# Patient Record
Sex: FEMALE | Race: BLACK OR AFRICAN AMERICAN | Employment: FULL TIME | URBAN - METROPOLITAN AREA
[De-identification: names, ages, dates, MRNs, and addresses within clinical notes are randomized per-mention and may not be internally consistent; named-entity substitution may affect disease eponyms.]

---

## 2018-08-30 ENCOUNTER — TELEPHONE (OUTPATIENT)
Dept: OBGYN CLINIC | Facility: CLINIC | Age: 28
End: 2018-08-30

## 2018-08-30 ENCOUNTER — OFFICE VISIT (OUTPATIENT)
Dept: OBGYN CLINIC | Facility: CLINIC | Age: 28
End: 2018-08-30
Payer: COMMERCIAL

## 2018-08-30 VITALS
DIASTOLIC BLOOD PRESSURE: 79 MMHG | BODY MASS INDEX: 33.16 KG/M2 | SYSTOLIC BLOOD PRESSURE: 118 MMHG | HEART RATE: 86 BPM | HEIGHT: 63 IN | WEIGHT: 187.13 LBS

## 2018-08-30 DIAGNOSIS — N92.6 MISSED MENSES: Primary | ICD-10-CM

## 2018-08-30 LAB
CONTROL LINE PRESENT WITH A CLEAR BACKGROUND (YES/NO): YES YES/NO
KIT LOT #: NORMAL NUMERIC
PREGNANCY TEST, URINE: POSITIVE

## 2018-08-30 PROCEDURE — 81025 URINE PREGNANCY TEST: CPT | Performed by: ADVANCED PRACTICE MIDWIFE

## 2018-08-30 PROCEDURE — 99202 OFFICE O/P NEW SF 15 MIN: CPT | Performed by: ADVANCED PRACTICE MIDWIFE

## 2018-08-30 RX ORDER — PRENATAL VIT,CAL 76/IRON/FOLIC 29 MG-1 MG
1 TABLET ORAL DAILY
Qty: 90 TABLET | Refills: 3 | Status: SHIPPED | OUTPATIENT
Start: 2018-08-30 | End: 2018-11-28

## 2018-08-30 NOTE — TELEPHONE ENCOUNTER
Pt saw BR today and would like an order put in for an [address]. please advise

## 2018-08-30 NOTE — PROGRESS NOTES
Venkata Thao is a 29year old , current EGA of Unknown presents for amenorrhea. Reports LMP as none, breastfeeding 16 month old. This is an unplanned pregnancy and patient is excited. +upt couple days ago.     Pt desires natural childbirth

## 2018-08-30 NOTE — TELEPHONE ENCOUNTER
Noted order placed per BR at 1501. Advised pt that order was placed. Pt has phone # to schedule.  Pt verbalized an understanding & agrees w/ plan

## 2018-08-31 ENCOUNTER — HOSPITAL ENCOUNTER (OUTPATIENT)
Dept: ULTRASOUND IMAGING | Age: 28
Discharge: HOME | End: 2018-08-31
Attending: ADVANCED PRACTICE MIDWIFE
Payer: COMMERCIAL

## 2018-08-31 DIAGNOSIS — N92.6 MISSED MENSES: ICD-10-CM

## 2018-08-31 PROCEDURE — 76801 OB US < 14 WKS SINGLE FETUS: CPT | Performed by: ADVANCED PRACTICE MIDWIFE

## 2018-08-31 PROCEDURE — 76817 TRANSVAGINAL US OBSTETRIC: CPT | Performed by: ADVANCED PRACTICE MIDWIFE

## 2018-09-01 ENCOUNTER — TELEPHONE (OUTPATIENT)
Dept: OBGYN CLINIC | Facility: CLINIC | Age: 28
End: 2018-09-01

## 2018-09-01 PROBLEM — N92.6 MISSED MENSES: Status: ACTIVE | Noted: 2018-09-01

## 2018-09-01 PROBLEM — Z78.9 BREASTFEEDING (INFANT): Status: ACTIVE | Noted: 2018-09-01

## 2018-09-22 ENCOUNTER — NURSE ONLY (OUTPATIENT)
Dept: OBGYN CLINIC | Facility: CLINIC | Age: 28
End: 2018-09-22
Payer: COMMERCIAL

## 2018-09-22 ENCOUNTER — LAB ENCOUNTER (OUTPATIENT)
Dept: LAB | Facility: HOSPITAL | Age: 28
End: 2018-09-22
Attending: ADVANCED PRACTICE MIDWIFE
Payer: COMMERCIAL

## 2018-09-22 VITALS — BODY MASS INDEX: 32.92 KG/M2 | WEIGHT: 188.13 LBS | HEIGHT: 63.5 IN

## 2018-09-22 DIAGNOSIS — Z3A.10 10 WEEKS GESTATION OF PREGNANCY: ICD-10-CM

## 2018-09-22 DIAGNOSIS — Z3A.10 10 WEEKS GESTATION OF PREGNANCY: Primary | ICD-10-CM

## 2018-09-22 LAB
ANTIBODY SCREEN: NEGATIVE
BASOPHILS # BLD: 0 K/UL (ref 0–0.2)
BASOPHILS NFR BLD: 0 %
EOSINOPHIL # BLD: 0.1 K/UL (ref 0–0.7)
EOSINOPHIL NFR BLD: 1 %
ERYTHROCYTE [DISTWIDTH] IN BLOOD BY AUTOMATED COUNT: 13.2 % (ref 11–15)
FOLATE SERPL-MCNC: >23.9 NG/ML
HBA1C MFR BLD: 5.3 % (ref 4–6)
HCT VFR BLD AUTO: 37 % (ref 35–48)
HGB BLD-MCNC: 12.6 G/DL (ref 12–16)
HGB S BLD QL SOLY: NEGATIVE
LYMPHOCYTES # BLD: 2.3 K/UL (ref 1–4)
LYMPHOCYTES NFR BLD: 39 %
MCH RBC QN AUTO: 31.8 PG (ref 27–32)
MCHC RBC AUTO-ENTMCNC: 34 G/DL (ref 32–37)
MCV RBC AUTO: 93.3 FL (ref 80–100)
MONOCYTES # BLD: 0.4 K/UL (ref 0–1)
MONOCYTES NFR BLD: 7 %
NEUTROPHILS # BLD AUTO: 3.2 K/UL (ref 1.8–7.7)
NEUTROPHILS NFR BLD: 53 %
PLATELET # BLD AUTO: 350 K/UL (ref 140–400)
PMV BLD AUTO: 7.2 FL (ref 7.4–10.3)
RBC # BLD AUTO: 3.96 M/UL (ref 3.7–5.4)
RH BLOOD TYPE: POSITIVE
RUBV IGG SER-ACNC: 11.6 IU/ML
VIT B12 SERPL-MCNC: 399 PG/ML (ref 181–914)
WBC # BLD AUTO: 6 K/UL (ref 4–11)

## 2018-09-22 PROCEDURE — 82607 VITAMIN B-12: CPT

## 2018-09-22 PROCEDURE — 82746 ASSAY OF FOLIC ACID SERUM: CPT

## 2018-09-22 PROCEDURE — 86900 BLOOD TYPING SEROLOGIC ABO: CPT | Performed by: ADVANCED PRACTICE MIDWIFE

## 2018-09-22 PROCEDURE — 83036 HEMOGLOBIN GLYCOSYLATED A1C: CPT

## 2018-09-22 PROCEDURE — 86780 TREPONEMA PALLIDUM: CPT

## 2018-09-22 PROCEDURE — 86762 RUBELLA ANTIBODY: CPT

## 2018-09-22 PROCEDURE — 85660 RBC SICKLE CELL TEST: CPT

## 2018-09-22 PROCEDURE — 86803 HEPATITIS C AB TEST: CPT

## 2018-09-22 PROCEDURE — 86850 RBC ANTIBODY SCREEN: CPT

## 2018-09-22 PROCEDURE — 36415 COLL VENOUS BLD VENIPUNCTURE: CPT | Performed by: ADVANCED PRACTICE MIDWIFE

## 2018-09-22 PROCEDURE — 87340 HEPATITIS B SURFACE AG IA: CPT

## 2018-09-22 PROCEDURE — 85025 COMPLETE CBC W/AUTO DIFF WBC: CPT

## 2018-09-22 PROCEDURE — 86901 BLOOD TYPING SEROLOGIC RH(D): CPT | Performed by: ADVANCED PRACTICE MIDWIFE

## 2018-09-22 PROCEDURE — 87389 HIV-1 AG W/HIV-1&-2 AB AG IA: CPT

## 2018-09-22 PROCEDURE — 87086 URINE CULTURE/COLONY COUNT: CPT

## 2018-09-22 NOTE — PROGRESS NOTES
NOB Education complete and information given to pt. Pt's PP BMI is 30.2 and she is vegetarian. 1 HR GTT, HA1C,, Vit B12 and Folate ordered with [facility]. Pt desires FTS. Referral entered. Pt will call for appt.   Referral entered for Nutrition Consult

## 2018-09-24 LAB
HBV SURFACE AG SERPL QL IA: NONREACTIVE
HCV AB SERPL QL IA: NONREACTIVE
HIV1+2 AB SERPL QL IA: NONREACTIVE
T PALLIDUM AB SER QL: NEGATIVE

## 2018-09-25 ENCOUNTER — TELEPHONE (OUTPATIENT)
Dept: OBGYN CLINIC | Facility: CLINIC | Age: 28
End: 2018-09-25

## 2018-09-25 PROBLEM — Z28.3 RUBELLA NON-IMMUNE STATUS, ANTEPARTUM: Status: ACTIVE | Noted: 2018-09-25

## 2018-09-25 PROBLEM — O99.891 RUBELLA NON-IMMUNE STATUS, ANTEPARTUM: Status: ACTIVE | Noted: 2018-09-25

## 2018-09-25 PROBLEM — Z28.39 RUBELLA NON-IMMUNE STATUS, ANTEPARTUM: Status: ACTIVE | Noted: 2018-09-25

## 2018-09-25 PROBLEM — O09.899 RUBELLA NON-IMMUNE STATUS, ANTEPARTUM: Status: ACTIVE | Noted: 2018-09-25

## 2018-09-25 NOTE — TELEPHONE ENCOUNTER
Spoke with pt and per MES labs are normal for pregnancy, advised Rubella is equivocal and she will need vaccine PP. Precautions given. Pt agreed and voiced understanding.

## 2018-09-25 NOTE — TELEPHONE ENCOUNTER
----- Message from Coty Pretty CNM sent at 9/25/2018  9:16 AM CDT -----  Please call labs are normal for pregnancy.   Rubella is Equivocal so she will need a vaccine PP Please give precautions

## 2018-10-06 ENCOUNTER — INITIAL PRENATAL (OUTPATIENT)
Dept: OBGYN CLINIC | Facility: CLINIC | Age: 28
End: 2018-10-06
Payer: COMMERCIAL

## 2018-10-06 VITALS
BODY MASS INDEX: 32 KG/M2 | SYSTOLIC BLOOD PRESSURE: 126 MMHG | HEART RATE: 103 BPM | WEIGHT: 185 LBS | DIASTOLIC BLOOD PRESSURE: 81 MMHG

## 2018-10-06 DIAGNOSIS — Z34.81 ENCOUNTER FOR SUPERVISION OF OTHER NORMAL PREGNANCY IN FIRST TRIMESTER: Primary | ICD-10-CM

## 2018-10-06 PROBLEM — Z28.21 INFLUENZA VACCINE REFUSED: Status: ACTIVE | Noted: 2018-10-06

## 2018-10-06 NOTE — PROGRESS NOTES
Denies any pain or bleeding. Slight nausea. Reports last pap ~ 1/2-2 yrs ago, normal. No hx of abnormals. Thinks may feel some flutters. Had hoped to do 1st tri screen but will not work out with her work schedule. Advised can do QUAD at 16-20 wks.  Normal P

## 2018-10-22 ENCOUNTER — TELEPHONE (OUTPATIENT)
Dept: OBGYN CLINIC | Facility: CLINIC | Age: 28
End: 2018-10-22

## 2018-11-03 ENCOUNTER — ROUTINE PRENATAL (OUTPATIENT)
Dept: OBGYN CLINIC | Facility: CLINIC | Age: 28
End: 2018-11-03
Payer: COMMERCIAL

## 2018-11-03 VITALS
HEART RATE: 96 BPM | SYSTOLIC BLOOD PRESSURE: 117 MMHG | HEIGHT: 63.5 IN | DIASTOLIC BLOOD PRESSURE: 78 MMHG | WEIGHT: 192.25 LBS | BODY MASS INDEX: 33.64 KG/M2

## 2018-11-03 DIAGNOSIS — Z34.82 PRENATAL CARE, SUBSEQUENT PREGNANCY, SECOND TRIMESTER: Primary | ICD-10-CM

## 2018-11-03 DIAGNOSIS — O99.210 OBESITY IN PREGNANCY: ICD-10-CM

## 2018-11-03 NOTE — PROGRESS NOTES
Feels well, feels flutters of FM. 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 Requesting paperwork to be filled out for her to be on desk duty inside.  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

## 2018-11-05 ENCOUNTER — TELEPHONE (OUTPATIENT)
Dept: OBGYN CLINIC | Facility: CLINIC | Age: 28
End: 2018-11-05

## 2018-11-06 NOTE — TELEPHONE ENCOUNTER
Accommodation form received in BOGDAN. Logged for processing. BOGDAN packet emailed to pt '[email or web address]'.  NK

## 2018-11-12 ENCOUNTER — TELEPHONE (OUTPATIENT)
Dept: OBGYN CLINIC | Facility: CLINIC | Age: 28
End: 2018-11-12

## 2018-11-12 NOTE — TELEPHONE ENCOUNTER
Msg left on VM that pt has an overdue lab - 1 HR GTT that needs to be completed ASAP. Pt was reminded not to eat for 1 hr prior to the testing and that she will remain at the lab for 1 hr until her blood is drawn.   Pt was advised to call with any further

## 2018-11-30 ENCOUNTER — TELEPHONE (OUTPATIENT)
Dept: OBGYN CLINIC | Facility: CLINIC | Age: 28
End: 2018-11-30

## 2018-11-30 DIAGNOSIS — Z34.82 ENCOUNTER FOR SUPERVISION OF OTHER NORMAL PREGNANCY IN SECOND TRIMESTER: ICD-10-CM

## 2018-11-30 NOTE — TELEPHONE ENCOUNTER
Constance Henao called requesting an order for Level II be reentered with a MFM Consult   Level II cannot be done without a MFM consult. OK per MBW.   Order entered per request.

## 2018-12-03 NOTE — TELEPHONE ENCOUNTER
Left message for pt that we are missing the provider portion of the [employer] form and that we still need HIPAA release and payment. Forms packet was emailed to pt on 11/5/18.  (devante)

## 2018-12-06 ENCOUNTER — HOSPITAL ENCOUNTER (OUTPATIENT)
Dept: PERINATAL CARE | Facility: HOSPITAL | Age: 28
Discharge: HOME | End: 2018-12-06
Attending: OBSTETRICS & GYNECOLOGY
Payer: COMMERCIAL

## 2018-12-06 ENCOUNTER — HOSPITAL ENCOUNTER (OUTPATIENT)
Dept: PERINATAL CARE | Facility: HOSPITAL | Age: 28
Discharge: HOME | End: 2018-12-06
Attending: ADVANCED PRACTICE MIDWIFE
Payer: COMMERCIAL

## 2018-12-06 VITALS
WEIGHT: 197 LBS | BODY MASS INDEX: 34.91 KG/M2 | HEIGHT: 63 IN | DIASTOLIC BLOOD PRESSURE: 80 MMHG | HEART RATE: 128 BPM | SYSTOLIC BLOOD PRESSURE: 118 MMHG

## 2018-12-06 DIAGNOSIS — O99.212 OBESITY AFFECTING PREGNANCY IN SECOND TRIMESTER: ICD-10-CM

## 2018-12-06 DIAGNOSIS — O99.212 OBESITY AFFECTING PREGNANCY IN SECOND TRIMESTER: Primary | ICD-10-CM

## 2018-12-06 PROCEDURE — 76811 OB US DETAILED SNGL FETUS: CPT | Performed by: OBSTETRICS & GYNECOLOGY

## 2018-12-06 PROCEDURE — 99243 OFF/OP CNSLTJ NEW/EST LOW 30: CPT | Performed by: OBSTETRICS & GYNECOLOGY

## 2018-12-06 RX ORDER — .BETA.-CAROTENE, ASCORBIC ACID, CHOLECALCIFEROL, .ALPHA.-TOCOPHEROL ACETATE, DL-, THIAMINE, RIBOFLAVIN, NIACINAMIDE, PYRIDOXINE HYDROCHLORIDE, FOLIC ACID, CYANOCOBALAMIN, CALCIUM PANTOTHENATE, CALCIUM CARBONATE, FERROUS FUMARATE, ZINC OXIDE AND DOCUSATE SODIUM 1000; 100; 400; 30; 3; 3; 15; 20; 1; 12; 7; 200; 29; 20; 25 [IU]/1; MG/1; [IU]/1; MG/1; MG/1; MG/1; MG/1; MG/1; MG/1; UG/1; MG/1; MG/1; MG/1; MG/1; MG/1
TABLET ORAL
Refills: 3 | COMMUNITY
Start: 2018-11-28

## 2018-12-06 NOTE — PROGRESS NOTES
Reason for Consult:   Dear Ms. Roman,    Thank you for requesting ultrasound evaluation and maternal fetal medicine consultation on [address]. As you are aware she is a 29year old female with a Marley pregnancy at 22w2d.   A maternal-fetal noted.           extremities:  nontender, no edema         OBESITY:  Obesity during pregnancy is associated with numerous maternal and  risks.   It is not clear whether obesity is a direct cause of adverse pregnancy outcome or whether the associati primarily associated with obesity-related medical and  complications, rather than an intrinsic predisposition to spontaneous  birth.  Prevention of  birth in these patients, therefore, should be directed toward prevention or managemen pellucidi. Face: eyes normal, profile normal, nose normal, lip normal, palate normal.  Heart: visualized and normal appearance: 3 vessel view, four-chamber, left outflow tract, right outflow tract, arches.       Genetic Sonogram:  Nuchal fold normal.  Ki

## 2018-12-10 ENCOUNTER — APPOINTMENT (OUTPATIENT)
Dept: LAB | Facility: HOSPITAL | Age: 28
End: 2018-12-10
Attending: ADVANCED PRACTICE MIDWIFE
Payer: COMMERCIAL

## 2018-12-10 ENCOUNTER — ROUTINE PRENATAL (OUTPATIENT)
Dept: OBGYN CLINIC | Facility: CLINIC | Age: 28
End: 2018-12-10
Payer: COMMERCIAL

## 2018-12-10 VITALS
BODY MASS INDEX: 35 KG/M2 | SYSTOLIC BLOOD PRESSURE: 129 MMHG | WEIGHT: 200 LBS | HEART RATE: 101 BPM | DIASTOLIC BLOOD PRESSURE: 84 MMHG | HEIGHT: 63.5 IN

## 2018-12-10 DIAGNOSIS — Z34.82 PRENATAL CARE, SUBSEQUENT PREGNANCY, SECOND TRIMESTER: Primary | ICD-10-CM

## 2018-12-10 DIAGNOSIS — Z3A.10 10 WEEKS GESTATION OF PREGNANCY: ICD-10-CM

## 2018-12-10 PROCEDURE — 36415 COLL VENOUS BLD VENIPUNCTURE: CPT

## 2018-12-10 PROCEDURE — 82950 GLUCOSE TEST: CPT

## 2018-12-10 PROCEDURE — 81002 URINALYSIS NONAUTO W/O SCOPE: CPT | Performed by: ADVANCED PRACTICE MIDWIFE

## 2018-12-10 NOTE — PROGRESS NOTES
No concerns. Normal level II - recommended NST at 36 weeks. Previous vaginal delivery at [facility], interested in waterbirth. Discussed weight gain, will do 1 hour glucola today.

## 2019-01-12 ENCOUNTER — ROUTINE PRENATAL (OUTPATIENT)
Dept: OBGYN CLINIC | Facility: CLINIC | Age: 29
End: 2019-01-12
Payer: COMMERCIAL

## 2019-01-12 VITALS
WEIGHT: 200 LBS | SYSTOLIC BLOOD PRESSURE: 120 MMHG | HEART RATE: 99 BPM | BODY MASS INDEX: 35 KG/M2 | HEIGHT: 63.5 IN | DIASTOLIC BLOOD PRESSURE: 76 MMHG

## 2019-01-12 DIAGNOSIS — Z34.82 ENCOUNTER FOR SUPERVISION OF OTHER NORMAL PREGNANCY IN SECOND TRIMESTER: Primary | ICD-10-CM

## 2019-01-12 LAB
APPEARANCE: CLEAR
MULTISTIX LOT#: NORMAL NUMERIC
PH, URINE: 7 (ref 4.5–8)
SPECIFIC GRAVITY: 1.01 (ref 1–1.03)
URINE-COLOR: YELLOW
UROBILINOGEN,SEMI-QN: 0.2 MG/DL (ref 0–1.9)

## 2019-01-12 PROCEDURE — 81002 URINALYSIS NONAUTO W/O SCOPE: CPT | Performed by: ADVANCED PRACTICE MIDWIFE

## 2019-01-12 NOTE — PROGRESS NOTES
Active fetus  No signs signs of PTL. Reviewed S&S of PTL  Pt has an umbilical hernia that is tender. Offered PT - declines  Pt to wear abdominal support. Warning signs reviewed  All questions answered.

## 2019-01-29 ENCOUNTER — TELEPHONE (OUTPATIENT)
Dept: OBGYN CLINIC | Facility: CLINIC | Age: 29
End: 2019-01-29

## 2019-01-29 NOTE — TELEPHONE ENCOUNTER
Notified pt of missed PN appt. Rescheduled on Sat. 2/2 w/ BR.  Pt verbalized an understanding & agrees w/ plan

## 2019-02-02 ENCOUNTER — ROUTINE PRENATAL (OUTPATIENT)
Dept: OBGYN CLINIC | Facility: CLINIC | Age: 29
End: 2019-02-02
Payer: COMMERCIAL

## 2019-02-02 VITALS
HEART RATE: 102 BPM | SYSTOLIC BLOOD PRESSURE: 121 MMHG | DIASTOLIC BLOOD PRESSURE: 75 MMHG | BODY MASS INDEX: 35.77 KG/M2 | WEIGHT: 204.38 LBS | HEIGHT: 63.5 IN

## 2019-02-02 DIAGNOSIS — Z34.83 PRENATAL CARE, SUBSEQUENT PREGNANCY, THIRD TRIMESTER: Primary | ICD-10-CM

## 2019-02-02 PROCEDURE — 81002 URINALYSIS NONAUTO W/O SCOPE: CPT | Performed by: ADVANCED PRACTICE MIDWIFE

## 2019-02-03 NOTE — PROGRESS NOTES
Doing well, has occasional sharp pain r/t Diastasis and umbilical hernia. Hernia soft, reducible. Rev warnings/when to call.  +Fm. Reminded to get 3rd T labs done ASAP. Encouraged regular exercise to slow weight gain and watch carb/sugar intake.   Varghese Perez

## 2019-02-11 ENCOUNTER — TELEPHONE (OUTPATIENT)
Dept: OBGYN CLINIC | Facility: CLINIC | Age: 29
End: 2019-02-11

## 2019-02-11 NOTE — TELEPHONE ENCOUNTER
Msg left on VM that pt needs to go to the lab to complete her 28 week labs. Pt was advised to call with any further questions or concerns.

## 2019-02-12 ENCOUNTER — LAB ENCOUNTER (OUTPATIENT)
Dept: LAB | Facility: HOSPITAL | Age: 29
End: 2019-02-12
Attending: ADVANCED PRACTICE MIDWIFE
Payer: COMMERCIAL

## 2019-02-12 DIAGNOSIS — Z34.82 ENCOUNTER FOR SUPERVISION OF OTHER NORMAL PREGNANCY IN SECOND TRIMESTER: ICD-10-CM

## 2019-02-12 LAB
DEPRECATED RDW RBC AUTO: 44.1 FL (ref 35.1–46.3)
ERYTHROCYTE [DISTWIDTH] IN BLOOD BY AUTOMATED COUNT: 13 % (ref 11–15)
GLUCOSE 1H P GLC SERPL-MCNC: 145 MG/DL
HCT VFR BLD AUTO: 33.7 % (ref 35–48)
HGB BLD-MCNC: 11.1 G/DL (ref 12–16)
MCH RBC QN AUTO: 30.8 PG (ref 26–34)
MCHC RBC AUTO-ENTMCNC: 32.9 G/DL (ref 31–37)
MCV RBC AUTO: 93.6 FL (ref 80–100)
PLATELET # BLD AUTO: 394 10(3)UL (ref 150–450)
RBC # BLD AUTO: 3.6 X10(6)UL (ref 3.8–5.3)
WBC # BLD AUTO: 8.3 X10(3) UL (ref 4–11)

## 2019-02-12 PROCEDURE — 85027 COMPLETE CBC AUTOMATED: CPT

## 2019-02-12 PROCEDURE — 82950 GLUCOSE TEST: CPT

## 2019-02-12 PROCEDURE — 86780 TREPONEMA PALLIDUM: CPT

## 2019-02-12 PROCEDURE — 36415 COLL VENOUS BLD VENIPUNCTURE: CPT

## 2019-02-12 PROCEDURE — 87389 HIV-1 AG W/HIV-1&-2 AB AG IA: CPT

## 2019-02-13 LAB — T PALLIDUM AB SER QL: NEGATIVE

## 2019-02-14 ENCOUNTER — TELEPHONE (OUTPATIENT)
Dept: OBGYN CLINIC | Facility: CLINIC | Age: 29
End: 2019-02-14

## 2019-02-14 NOTE — TELEPHONE ENCOUNTER
----- Message from Chetan Black CNM sent at 2/13/2019  7:21 PM CST -----  Please call 1 hr GTT was abnormal  Pt needs a 3 hr GTT and HAIC.   Please order

## 2019-02-16 ENCOUNTER — ROUTINE PRENATAL (OUTPATIENT)
Dept: OBGYN CLINIC | Facility: CLINIC | Age: 29
End: 2019-02-16
Payer: COMMERCIAL

## 2019-02-16 VITALS
WEIGHT: 203.63 LBS | HEART RATE: 97 BPM | BODY MASS INDEX: 36 KG/M2 | DIASTOLIC BLOOD PRESSURE: 75 MMHG | SYSTOLIC BLOOD PRESSURE: 124 MMHG

## 2019-02-16 DIAGNOSIS — R73.09 ABNORMAL GTT (GLUCOSE TOLERANCE TEST): Primary | ICD-10-CM

## 2019-02-16 DIAGNOSIS — Z34.83 ENCOUNTER FOR SUPERVISION OF OTHER NORMAL PREGNANCY IN THIRD TRIMESTER: ICD-10-CM

## 2019-02-16 PROBLEM — R73.02 IMPAIRED GLUCOSE TOLERANCE: Status: ACTIVE | Noted: 2019-02-16

## 2019-02-16 LAB
APPEARANCE: CLEAR
MULTISTIX LOT#: NORMAL NUMERIC
PH, URINE: 7.5 (ref 4.5–8)
SPECIFIC GRAVITY: 1.01 (ref 1–1.03)
URINE-COLOR: YELLOW
UROBILINOGEN,SEMI-QN: 0.2 MG/DL (ref 0–1.9)

## 2019-02-16 PROCEDURE — 81002 URINALYSIS NONAUTO W/O SCOPE: CPT | Performed by: ADVANCED PRACTICE MIDWIFE

## 2019-02-16 NOTE — PROGRESS NOTES
Feels well, endorses active baby. No change in umbilical hernia pain. Reports she tried the abdominal binder but she did not like it. Discussed trying out kinesiotape. Reviewed warning signs for the hernia and when to call. Denies any ctx, VB or LOF.  May h

## 2019-02-18 NOTE — TELEPHONE ENCOUNTER
TYRATCB  Msg left on VM requesting pt call back to discuss test results and the follow up that is needed.

## 2019-02-19 NOTE — TELEPHONE ENCOUNTER
Pt was advised at PN Appt with MJ that her 1 Hr GTT was elevated and she needs to complete the 3 Hr GTT. Pt was given information on the meal alternative and the contact information to connie and schedule her appt at the lab.   Reminder note sent to rolando karimi

## 2019-02-28 ENCOUNTER — ROUTINE PRENATAL (OUTPATIENT)
Dept: OBGYN CLINIC | Facility: CLINIC | Age: 29
End: 2019-02-28
Payer: COMMERCIAL

## 2019-02-28 VITALS
HEIGHT: 63.5 IN | WEIGHT: 205.5 LBS | BODY MASS INDEX: 35.96 KG/M2 | HEART RATE: 103 BPM | SYSTOLIC BLOOD PRESSURE: 127 MMHG | DIASTOLIC BLOOD PRESSURE: 85 MMHG

## 2019-02-28 DIAGNOSIS — Z34.83 ENCOUNTER FOR SUPERVISION OF OTHER NORMAL PREGNANCY IN THIRD TRIMESTER: Primary | ICD-10-CM

## 2019-02-28 PROBLEM — Z28.21 REFUSES TETANUS, DIPHTHERIA, AND ACELLULAR PERTUSSIS (TDAP) VACCINATION: Status: ACTIVE | Noted: 2019-02-28

## 2019-02-28 LAB
APPEARANCE: CLEAR
MULTISTIX LOT#: NORMAL NUMERIC
PH, URINE: 6 (ref 4.5–8)
SPECIFIC GRAVITY: 1.01 (ref 1–1.03)
URINE-COLOR: YELLOW
UROBILINOGEN,SEMI-QN: 0.2 MG/DL (ref 0–1.9)

## 2019-02-28 PROCEDURE — 81002 URINALYSIS NONAUTO W/O SCOPE: CPT | Performed by: ADVANCED PRACTICE MIDWIFE

## 2019-02-28 NOTE — PROGRESS NOTES
Active fetus  No signs signs of PTL. Reviewed S&S of PTL  Warning signs reviewed  All questions answered.   Pt states she will schedule her 3 hr GTT this weekend

## 2019-03-16 ENCOUNTER — TELEPHONE (OUTPATIENT)
Dept: OBGYN CLINIC | Facility: CLINIC | Age: 29
End: 2019-03-16

## 2019-03-16 ENCOUNTER — ROUTINE PRENATAL (OUTPATIENT)
Dept: OBGYN CLINIC | Facility: CLINIC | Age: 29
End: 2019-03-16
Payer: COMMERCIAL

## 2019-03-16 VITALS
HEIGHT: 63.5 IN | SYSTOLIC BLOOD PRESSURE: 128 MMHG | WEIGHT: 208.38 LBS | HEART RATE: 105 BPM | DIASTOLIC BLOOD PRESSURE: 81 MMHG | BODY MASS INDEX: 36.46 KG/M2

## 2019-03-16 DIAGNOSIS — Z34.83 PRENATAL CARE, SUBSEQUENT PREGNANCY, THIRD TRIMESTER: Primary | ICD-10-CM

## 2019-03-16 PROCEDURE — 81002 URINALYSIS NONAUTO W/O SCOPE: CPT | Performed by: ADVANCED PRACTICE MIDWIFE

## 2019-03-16 NOTE — PROGRESS NOTES
Active fetus  No signs signs of PTL. Reviewed S&S of PTL  Warning signs reviewed  All questions answered. Pt has not completed 3 hr GTT because \"I really don't want to have it done\". Explained to pt that the 3 hr GTT or 2 hr GTT is needed for dx.   Pt

## 2019-03-18 ENCOUNTER — TELEPHONE (OUTPATIENT)
Dept: OBGYN CLINIC | Facility: CLINIC | Age: 29
End: 2019-03-18

## 2019-03-18 ENCOUNTER — LABORATORY ENCOUNTER (OUTPATIENT)
Dept: LAB | Facility: REFERENCE LAB | Age: 29
End: 2019-03-18
Attending: ADVANCED PRACTICE MIDWIFE
Payer: COMMERCIAL

## 2019-03-18 DIAGNOSIS — R73.09 ABNORMAL GTT (GLUCOSE TOLERANCE TEST): ICD-10-CM

## 2019-03-18 LAB
1 HR GLUCOSE GESTATIONAL: 169 MG/DL
GLUCOSE 1H P GLC SERPL-MCNC: 134 MG/DL
GLUCOSE 3H P GLC SERPL-MCNC: 71 MG/DL
GLUCOSE P FAST SERPL-MCNC: 92 MG/DL

## 2019-03-18 PROCEDURE — 36415 COLL VENOUS BLD VENIPUNCTURE: CPT

## 2019-03-18 PROCEDURE — 82951 GLUCOSE TOLERANCE TEST (GTT): CPT

## 2019-03-18 PROCEDURE — 82952 GTT-ADDED SAMPLES: CPT

## 2019-03-18 NOTE — TELEPHONE ENCOUNTER
Spoke with Carmenza Troy in the lab and requested that the [facility]  ordered in pt's chart be added to the pt's lab work done today. Carmenza Troy will contact registration and have the lab added.

## 2019-03-22 ENCOUNTER — ROUTINE PRENATAL (OUTPATIENT)
Dept: OBGYN CLINIC | Facility: CLINIC | Age: 29
End: 2019-03-22
Payer: COMMERCIAL

## 2019-03-22 VITALS
HEART RATE: 108 BPM | BODY MASS INDEX: 36.05 KG/M2 | WEIGHT: 206 LBS | SYSTOLIC BLOOD PRESSURE: 113 MMHG | DIASTOLIC BLOOD PRESSURE: 79 MMHG | HEIGHT: 63.5 IN

## 2019-03-22 DIAGNOSIS — O99.210 OBESITY IN PREGNANCY: Primary | ICD-10-CM

## 2019-03-22 LAB
APPEARANCE: CLEAR
MULTISTIX LOT#: NORMAL NUMERIC
PH, URINE: 7 (ref 4.5–8)
SPECIFIC GRAVITY: 1 (ref 1–1.03)
URINE-COLOR: YELLOW
UROBILINOGEN,SEMI-QN: 0.2 MG/DL (ref 0–1.9)

## 2019-03-22 NOTE — PROGRESS NOTES
Baby active. No signs PTL. Desires to wait until next visit to do GBS. Baby feels on small side today. Growth u/s ordered. Importance riley active FM reviewed.

## 2019-03-29 ENCOUNTER — ROUTINE PRENATAL (OUTPATIENT)
Dept: OBGYN CLINIC | Facility: CLINIC | Age: 29
End: 2019-03-29
Payer: COMMERCIAL

## 2019-03-29 VITALS
DIASTOLIC BLOOD PRESSURE: 79 MMHG | SYSTOLIC BLOOD PRESSURE: 111 MMHG | HEART RATE: 103 BPM | WEIGHT: 208 LBS | BODY MASS INDEX: 36 KG/M2

## 2019-03-29 DIAGNOSIS — Z34.83 ENCOUNTER FOR SUPERVISION OF OTHER NORMAL PREGNANCY IN THIRD TRIMESTER: Primary | ICD-10-CM

## 2019-03-29 LAB
APPEARANCE: CLEAR
MULTISTIX LOT#: NORMAL NUMERIC
PH, URINE: 5 (ref 4.5–8)
SPECIFIC GRAVITY: 1.02 (ref 1–1.03)
URINE-COLOR: YELLOW
UROBILINOGEN,SEMI-QN: 0 MG/DL (ref 0–1.9)

## 2019-03-29 PROCEDURE — 81002 URINALYSIS NONAUTO W/O SCOPE: CPT | Performed by: ADVANCED PRACTICE MIDWIFE

## 2019-03-29 NOTE — PROGRESS NOTES
Reminded pt that she needs to complete HA1C which was not done at her her 3hr gtt. Pt states she will not complete. She has a lot of bruising from last testing. Pt passed 3hr gtt. Order canceled.  MJ notified

## 2019-03-29 NOTE — PROGRESS NOTES
Feels well, endorses active baby. Denies any ctx, VB or LOF. Birth plan reviewed. Patient desires undedicated birth. Will have her  and sister with her for support. Is OK with baby meds. Growth ultrasound scheduled for Wednesday.  She will come for a

## 2019-04-03 ENCOUNTER — ROUTINE PRENATAL (OUTPATIENT)
Dept: OBGYN CLINIC | Facility: CLINIC | Age: 29
End: 2019-04-03
Payer: COMMERCIAL

## 2019-04-03 ENCOUNTER — HOSPITAL ENCOUNTER (OUTPATIENT)
Dept: PERINATAL CARE | Facility: HOSPITAL | Age: 29
Discharge: HOME | End: 2019-04-03
Attending: ADVANCED PRACTICE MIDWIFE
Payer: COMMERCIAL

## 2019-04-03 ENCOUNTER — HOSPITAL ENCOUNTER (OUTPATIENT)
Dept: PERINATAL CARE | Facility: HOSPITAL | Age: 29
Discharge: HOME | End: 2019-04-03
Attending: OBSTETRICS & GYNECOLOGY
Payer: COMMERCIAL

## 2019-04-03 VITALS
SYSTOLIC BLOOD PRESSURE: 125 MMHG | DIASTOLIC BLOOD PRESSURE: 77 MMHG | HEIGHT: 63.5 IN | HEART RATE: 105 BPM | WEIGHT: 208.25 LBS | BODY MASS INDEX: 36.44 KG/M2

## 2019-04-03 VITALS
SYSTOLIC BLOOD PRESSURE: 118 MMHG | BODY MASS INDEX: 36 KG/M2 | HEART RATE: 106 BPM | WEIGHT: 208 LBS | DIASTOLIC BLOOD PRESSURE: 70 MMHG

## 2019-04-03 DIAGNOSIS — Z34.83 PRENATAL CARE, SUBSEQUENT PREGNANCY, THIRD TRIMESTER: Primary | ICD-10-CM

## 2019-04-03 DIAGNOSIS — O99.213 OBESITY AFFECTING PREGNANCY IN THIRD TRIMESTER: Primary | ICD-10-CM

## 2019-04-03 DIAGNOSIS — O99.213 OBESITY AFFECTING PREGNANCY IN THIRD TRIMESTER: ICD-10-CM

## 2019-04-03 PROBLEM — Z53.20 REFUSAL OF TREATMENT BY PATIENT: Status: ACTIVE | Noted: 2019-04-03

## 2019-04-03 PROCEDURE — 81002 URINALYSIS NONAUTO W/O SCOPE: CPT | Performed by: ADVANCED PRACTICE MIDWIFE

## 2019-04-03 PROCEDURE — 76819 FETAL BIOPHYS PROFIL W/O NST: CPT | Performed by: OBSTETRICS & GYNECOLOGY

## 2019-04-03 PROCEDURE — 76816 OB US FOLLOW-UP PER FETUS: CPT | Performed by: OBSTETRICS & GYNECOLOGY

## 2019-04-03 PROCEDURE — 99213 OFFICE O/P EST LOW 20 MIN: CPT | Performed by: OBSTETRICS & GYNECOLOGY

## 2019-04-03 PROCEDURE — 76805 OB US >/= 14 WKS SNGL FETUS: CPT | Performed by: OBSTETRICS & GYNECOLOGY

## 2019-04-03 NOTE — ADDENDUM NOTE
Encounter addended by:  Dionicio Giles DO on: 4/3/2019 3:26 PM   Actions taken: Charge Capture section accepted

## 2019-04-03 NOTE — PROGRESS NOTES
Feels well, endorses active fetus. Denies any SOL, just having a lot of pelvic pressure. Had growth sono today and EFW is 6lb 10oz, 29%tile. Patient aware that she should be getting weekly NSTs.  She says that she does not think she needs them because her 3

## 2019-04-03 NOTE — ADDENDUM NOTE
Encounter addended by: Neva Figueroa on: 4/3/2019 1:27 PM   Actions taken: Charge Capture section accepted

## 2019-04-03 NOTE — PROGRESS NOTES
Reason for Consult:   Dear Ms. Roman,    Thank you for requesting ultrasound evaluation and maternal fetal medicine consultation on [address]. As you are aware she is a 34year old female with a Marley pregnancy.   A maternal-fetal medicine nontender, no edema             OB ULTRASOUND REPORT   See imaging tab for complete ultrasound report or in PACS    Fetal Heart Rate: Present 169 bpm  Fetal Presentation: Vertex  Amniotic fluid MVP: 14.9 cm  Cord: 3 vessel cord  Placental Location: Posteri

## 2019-04-08 NOTE — TELEPHONE ENCOUNTER
Al Layne,     Please sign off on form: Disability pending delivery   -Highlight the patient and hit \"Chart\" button. -In Chart Review, w/in the Encounter tab - click 1 time on the Telephone call encounter for 3/16/19. Scroll down the telephone encounter.  -Click \"scan on\" blue Hyperlink under \"Media\" heading for Disability [employer] 4/8/19 w/in the telephone enc.  -Click on Acknowledge button at the bottom right corner and left-click onto image, signature stamp appears and drag signature to Provider signature line. Stamp will turn blue. Close window.     Thank you,  Anmol Vicente

## 2019-04-11 NOTE — TELEPHONE ENCOUNTER
FMLA/Disability form has been completed and faxed to [facility] [phone].  My chart email sent

## 2019-04-12 ENCOUNTER — ROUTINE PRENATAL (OUTPATIENT)
Dept: OBGYN CLINIC | Facility: CLINIC | Age: 29
End: 2019-04-12
Payer: COMMERCIAL

## 2019-04-12 VITALS
DIASTOLIC BLOOD PRESSURE: 79 MMHG | SYSTOLIC BLOOD PRESSURE: 125 MMHG | BODY MASS INDEX: 36.57 KG/M2 | WEIGHT: 209 LBS | HEART RATE: 99 BPM | HEIGHT: 63.5 IN

## 2019-04-12 DIAGNOSIS — O48.0 POST-TERM PREGNANCY, 40-42 WEEKS OF GESTATION: Primary | ICD-10-CM

## 2019-04-16 ENCOUNTER — HOSPITAL ENCOUNTER (OUTPATIENT)
Facility: HOSPITAL | Age: 29
Setting detail: OBSERVATION
Discharge: HOME | End: 2019-04-16
Attending: ADVANCED PRACTICE MIDWIFE | Admitting: OBSTETRICS & GYNECOLOGY
Payer: COMMERCIAL

## 2019-04-16 ENCOUNTER — ROUTINE PRENATAL (OUTPATIENT)
Dept: OBGYN CLINIC | Facility: CLINIC | Age: 29
End: 2019-04-16
Payer: COMMERCIAL

## 2019-04-16 VITALS
WEIGHT: 211 LBS | HEART RATE: 96 BPM | SYSTOLIC BLOOD PRESSURE: 122 MMHG | DIASTOLIC BLOOD PRESSURE: 86 MMHG | BODY MASS INDEX: 37 KG/M2

## 2019-04-16 DIAGNOSIS — Z34.83 ENCOUNTER FOR SUPERVISION OF OTHER NORMAL PREGNANCY IN THIRD TRIMESTER: Primary | ICD-10-CM

## 2019-04-16 PROBLEM — Z34.90 PREGNANCY: Status: ACTIVE | Noted: 2019-04-16

## 2019-04-16 PROCEDURE — 59025 FETAL NON-STRESS TEST: CPT | Performed by: ADVANCED PRACTICE MIDWIFE

## 2019-04-16 NOTE — TRIAGE
[facility]      Triage Note    Venkata Thao Patient Status:  Outpatient    3/30/1990 MRN [id number]   Location [address] [facility] Attending Iglesia Castillo, [address] Day # 0 PCP Jhony Mireles

## 2019-04-17 ENCOUNTER — TELEPHONE (OUTPATIENT)
Dept: OBGYN UNIT | Facility: HOSPITAL | Age: 29
End: 2019-04-17

## 2019-04-17 NOTE — PROGRESS NOTES
Doing well. +FM. REv SOL/warnings/when to call. Advised to start NSTs today. Rev B/R of IOL vs expectant management beyond 41 weeks. Pt would like to schedule for 41 weeks  IOL 4/23 at 0700.   Pt to return to office Fri or Sat for membrane sweep if un

## 2019-04-18 ENCOUNTER — TELEPHONE (OUTPATIENT)
Dept: OBGYN CLINIC | Facility: CLINIC | Age: 29
End: 2019-04-18

## 2019-04-18 ENCOUNTER — HOSPITAL ENCOUNTER (INPATIENT)
Facility: HOSPITAL | Age: 29
LOS: 3 days | Discharge: HOME | End: 2019-04-21
Attending: ADVANCED PRACTICE MIDWIFE | Admitting: OBSTETRICS & GYNECOLOGY
Payer: COMMERCIAL

## 2019-04-18 DIAGNOSIS — O92.29 POSTPARTUM NIPPLE PAIN: Primary | ICD-10-CM

## 2019-04-18 PROBLEM — O42.90 PROM (PREMATURE RUPTURE OF MEMBRANES): Status: ACTIVE | Noted: 2019-04-18

## 2019-04-18 RX ORDER — TRISODIUM CITRATE DIHYDRATE AND CITRIC ACID MONOHYDRATE 500; 334 MG/5ML; MG/5ML
30 SOLUTION ORAL AS NEEDED
Status: DISCONTINUED | OUTPATIENT
Start: 2019-04-18 | End: 2019-04-19

## 2019-04-18 RX ORDER — TERBUTALINE SULFATE 1 MG/ML
0.25 INJECTION, SOLUTION SUBCUTANEOUS AS NEEDED
Status: DISCONTINUED | OUTPATIENT
Start: 2019-04-18 | End: 2019-04-19

## 2019-04-18 RX ORDER — IBUPROFEN 600 MG/1
600 TABLET ORAL ONCE AS NEEDED
Status: DISCONTINUED | OUTPATIENT
Start: 2019-04-18 | End: 2019-04-19

## 2019-04-18 RX ORDER — LIDOCAINE HYDROCHLORIDE 10 MG/ML
30 INJECTION, SOLUTION EPIDURAL; INFILTRATION; INTRACAUDAL; PERINEURAL ONCE
Status: DISCONTINUED | OUTPATIENT
Start: 2019-04-18 | End: 2019-04-19

## 2019-04-18 RX ORDER — AMMONIA INHALANTS 0.04 G/.3ML
0.3 INHALANT RESPIRATORY (INHALATION) AS NEEDED
Status: DISCONTINUED | OUTPATIENT
Start: 2019-04-18 | End: 2019-04-19

## 2019-04-18 NOTE — TELEPHONE ENCOUNTER
Incoming call on labor line. A few minutes ago pt reports a small gush of fluid when she stood up. Went to [facility] & continues to have a trickle of clear fluid. + normal fm. Denies any cramping, ctx or bleeding. Is feeling pressure. GBS neg.  Desires water birth

## 2019-04-19 PROBLEM — O36.8990 MECONIUM IN AMNIOTIC FLUID AFFECTING MANAGEMENT OF MOTHER: Status: ACTIVE | Noted: 2019-04-19

## 2019-04-19 PROCEDURE — 10907ZC DRAINAGE OF AMNIOTIC FLUID, THERAPEUTIC FROM PRODUCTS OF CONCEPTION, VIA NATURAL OR ARTIFICIAL OPENING: ICD-10-PCS | Performed by: ADVANCED PRACTICE MIDWIFE

## 2019-04-19 PROCEDURE — 59400 OBSTETRICAL CARE: CPT | Performed by: ADVANCED PRACTICE MIDWIFE

## 2019-04-19 RX ORDER — NALBUPHINE HCL 10 MG/ML
2.5 AMPUL (ML) INJECTION
Status: DISCONTINUED | OUTPATIENT
Start: 2019-04-19 | End: 2019-04-19

## 2019-04-19 RX ORDER — OXYTOCIN 10 [USP'U]/ML
INJECTION, SOLUTION INTRAMUSCULAR; INTRAVENOUS
Status: COMPLETED
Start: 2019-04-19 | End: 2019-04-19

## 2019-04-19 RX ORDER — SODIUM CHLORIDE, SODIUM LACTATE, POTASSIUM CHLORIDE, CALCIUM CHLORIDE 600; 310; 30; 20 MG/100ML; MG/100ML; MG/100ML; MG/100ML
INJECTION, SOLUTION INTRAVENOUS CONTINUOUS
Status: DISCONTINUED | OUTPATIENT
Start: 2019-04-19 | End: 2019-04-19

## 2019-04-19 RX ORDER — METHYLERGONOVINE MALEATE 0.2 MG/ML
0.2 INJECTION INTRAVENOUS ONCE
Status: COMPLETED | OUTPATIENT
Start: 2019-04-19 | End: 2019-04-19

## 2019-04-19 RX ORDER — CHOLECALCIFEROL (VITAMIN D3) 25 MCG
1 TABLET,CHEWABLE ORAL DAILY
Status: DISCONTINUED | OUTPATIENT
Start: 2019-04-19 | End: 2019-04-21

## 2019-04-19 RX ORDER — BUPIVACAINE HYDROCHLORIDE 2.5 MG/ML
30 INJECTION, SOLUTION EPIDURAL; INFILTRATION; INTRACAUDAL ONCE
Status: DISCONTINUED | OUTPATIENT
Start: 2019-04-19 | End: 2019-04-19

## 2019-04-19 RX ORDER — METHYLERGONOVINE MALEATE 0.2 MG/ML
INJECTION INTRAVENOUS
Status: DISPENSED
Start: 2019-04-19 | End: 2019-04-20

## 2019-04-19 RX ORDER — DOCUSATE SODIUM 100 MG/1
100 CAPSULE, LIQUID FILLED ORAL 2 TIMES DAILY
Status: DISCONTINUED | OUTPATIENT
Start: 2019-04-19 | End: 2019-04-21

## 2019-04-19 RX ORDER — DIAPER,BRIEF,INFANT-TODD,DISP
1 EACH MISCELLANEOUS EVERY 6 HOURS PRN
Status: DISCONTINUED | OUTPATIENT
Start: 2019-04-19 | End: 2019-04-21

## 2019-04-19 RX ORDER — AMMONIA INHALANTS 0.04 G/.3ML
0.3 INHALANT RESPIRATORY (INHALATION) AS NEEDED
Status: DISCONTINUED | OUTPATIENT
Start: 2019-04-19 | End: 2019-04-21

## 2019-04-19 RX ORDER — BISACODYL 10 MG
10 SUPPOSITORY, RECTAL RECTAL ONCE AS NEEDED
Status: DISCONTINUED | OUTPATIENT
Start: 2019-04-19 | End: 2019-04-21

## 2019-04-19 RX ORDER — MISOPROSTOL 200 UG/1
TABLET ORAL
Status: DISCONTINUED
Start: 2019-04-19 | End: 2019-04-19 | Stop reason: WASHOUT

## 2019-04-19 RX ORDER — LIDOCAINE HYDROCHLORIDE AND EPINEPHRINE 20; 5 MG/ML; UG/ML
20 INJECTION, SOLUTION EPIDURAL; INFILTRATION; INTRACAUDAL; PERINEURAL ONCE
Status: DISCONTINUED | OUTPATIENT
Start: 2019-04-19 | End: 2019-04-19

## 2019-04-19 RX ORDER — ONDANSETRON 2 MG/ML
4 INJECTION INTRAMUSCULAR; INTRAVENOUS EVERY 6 HOURS PRN
Status: DISCONTINUED | OUTPATIENT
Start: 2019-04-19 | End: 2019-04-21

## 2019-04-19 RX ORDER — EPHEDRINE SULFATE/0.9% NACL/PF 25 MG/5 ML
5 SYRINGE (ML) INTRAVENOUS AS NEEDED
Status: DISCONTINUED | OUTPATIENT
Start: 2019-04-19 | End: 2019-04-19

## 2019-04-19 RX ORDER — IBUPROFEN 200 MG
200 TABLET ORAL EVERY 4 HOURS PRN
Status: DISCONTINUED | OUTPATIENT
Start: 2019-04-19 | End: 2019-04-21

## 2019-04-19 RX ORDER — IBUPROFEN 600 MG/1
600 TABLET ORAL EVERY 4 HOURS PRN
Status: DISCONTINUED | OUTPATIENT
Start: 2019-04-19 | End: 2019-04-21

## 2019-04-19 RX ORDER — CARBOPROST TROMETHAMINE 250 UG/ML
INJECTION, SOLUTION INTRAMUSCULAR
Status: DISCONTINUED
Start: 2019-04-19 | End: 2019-04-19 | Stop reason: WASHOUT

## 2019-04-19 RX ORDER — IBUPROFEN 200 MG
400 TABLET ORAL EVERY 4 HOURS PRN
Status: DISCONTINUED | OUTPATIENT
Start: 2019-04-19 | End: 2019-04-21

## 2019-04-19 RX ORDER — SIMETHICONE 80 MG
80 TABLET,CHEWABLE ORAL 3 TIMES DAILY PRN
Status: DISCONTINUED | OUTPATIENT
Start: 2019-04-19 | End: 2019-04-21

## 2019-04-19 NOTE — PROGRESS NOTES
[facility]    Labor Progress Note    Chuckie Bourne Patient Status:  Inpatient    3/30/1990 MRN [id number]   Location [address] Attending Maria R Witt, [address] Day # 1 PCP [facility]

## 2019-04-19 NOTE — H&P
[address] Patient Status:  Observation    3/30/1990 MRN [id number]   Location [address] [place] [address] Attending Mary Cruz, [address] Day # 0 PCP Brayan Menezes Review of Systems:   As documented in HPI        Physical Exam:        Constitutional: alert, appears stated age, cooperative and no distress  Respiratory: Non labored  Cardiac: regular rate and rhythm  Abdomen: FHT present and estimated fetal weight questions answered, all appropriate consents will be signed at the Hospital. Admission is planned for today. Soaps suds enema.     Huong Dawn  4/18/2019  10:27 PM

## 2019-04-19 NOTE — PROGRESS NOTES
Received pt in . Pt transferred via [facility]. VS and assessment WNL. Oriented to room. POC reviewed. Instructed to call for assistance when ready to void. Report received from Sandy Carrillo RN.

## 2019-04-19 NOTE — L&D DELIVERY NOTE
Mary Jain Girl Manju Seals [[id number]]    Labor Events    Rupture date/time:  2019 1500     Rupture type:  SROM  Fluid color:  Meconium     Lacerations    No data filed       Presentation    No data filed      Operative Delivery    No data filed push x 10mins in hands and knees position. Pushed to  of a viable female infant. Immediate cry, color pink, and good tone. Neonatologist in attendance at delivery d/t particulate meconium amniotic fluid. Patient assisted back onto her back.  During the p

## 2019-04-19 NOTE — PROGRESS NOTES
[facility]    Labor Progress Note    Marta Baker Patient Status:  Inpatient    3/30/1990 MRN [id number]   Location [address]  Attending Lennart Dancer, [address] [facility] Day # 1 PCP [facility] verbalizes understanding and agreement.         Olivia Bray CNM, APRN  4/19/2019

## 2019-04-19 NOTE — TRIAGE
[facility]      Triage Note    Marta Baker Patient Status:  Inpatient    3/30/1990 MRN [id number]   Location [address] Attending Lennart Dancer, [address] Day # 1 PCP Lacey Conn

## 2019-04-19 NOTE — PROGRESS NOTES
[facility]    Labor Progress Note    Kim Dinora Patient Status:  Inpatient    3/30/1990 MRN [id number]   Location [address] [facility] Attending Aracely Shay, [address] [facility] Day # 1 PCP [facility]

## 2019-04-20 PROBLEM — O99.891 RUBELLA NON-IMMUNE STATUS, ANTEPARTUM: Status: RESOLVED | Noted: 2018-09-25 | Resolved: 2019-04-20

## 2019-04-20 PROBLEM — Z28.3 RUBELLA NON-IMMUNE STATUS, ANTEPARTUM: Status: RESOLVED | Noted: 2018-09-25 | Resolved: 2019-04-20

## 2019-04-20 PROBLEM — Z28.39 RUBELLA NON-IMMUNE STATUS, ANTEPARTUM: Status: RESOLVED | Noted: 2018-09-25 | Resolved: 2019-04-20

## 2019-04-20 PROBLEM — O09.899 RUBELLA NON-IMMUNE STATUS, ANTEPARTUM: Status: RESOLVED | Noted: 2018-09-25 | Resolved: 2019-04-20

## 2019-04-20 NOTE — PROGRESS NOTES
[place] [facility] - [facility]    OB/GYNE Progress Note      Cheral Cheadle Patient Status:  Inpatient    3/30/1990 MRN    Location [facility] 3SE Attending [place] #  PCP            HPI:   Cheral Cheadle is a 34 year ol (36.6 °C) (Oral)   Resp 16   LMP  (LMP Unknown)   Breastfeeding? Yes   There is no height or weight on file to calculate BMI.    GENERAL: well developed, well nourished, in no apparent distress  BREAST: soft, nipples everted and intact  LUNGS: clear  ABDOME

## 2019-04-20 NOTE — PLAN OF CARE
Problem: Patient Centered Care  Goal: Patient preferences are identified and integrated in the patient's plan of care  Description  Interventions:    - Provide timely, complete, and accurate information to patient/family  - Incorporate patient and family (e.g., rooting, lip smacking, sucking fingers/hand) versus late cue of crying.  - Discuss/demonstrate breast feeding aids (e.g., infant sling, nursing footstool/pillows, and breast pumps).   - Encourage mother/other family members to express feelings/concer

## 2019-04-21 VITALS
RESPIRATION RATE: 16 BRPM | DIASTOLIC BLOOD PRESSURE: 77 MMHG | TEMPERATURE: 98 F | HEART RATE: 108 BPM | SYSTOLIC BLOOD PRESSURE: 129 MMHG

## 2019-04-21 NOTE — LACTATION NOTE
This note was copied from a baby's chart.   LACTATION NOTE - INFANT    Evaluation Type  Evaluation Type: Inpatient    Problems & Assessment  Problems Diagnosed or Identified: Latch difficulty  Problems: comment/detail: only on L breast  Infant Assessment: S

## 2019-04-21 NOTE — PLAN OF CARE
Problem: Patient Centered Care  Goal: Patient preferences are identified and integrated in the patient's plan of care  Description  Interventions:  - What would you like us to know as we care for you?   - Provide timely, complete, and accurate informatio information as needed about early infant feeding cues (e.g., rooting, lip smacking, sucking fingers/hand) versus late cue of crying.  - Discuss/demonstrate breast feeding aids (e.g., infant sling, nursing footstool/pillows, and breast pumps).   - Encourage make a follow-up appointment with OB. Mother is interacting appropriately with baby. Verbalized understanding of follow-up instructions. Discharged in stable condition via wheelchair.

## 2019-04-21 NOTE — LACTATION NOTE
LACTATION NOTE - MOTHER      Evaluation Type: Inpatient    Problems identified  Problems identified: Knowledge deficit    Maternal history  Other/comment: PROM w/mec, impaired glucose tolerance    Breastfeeding goal  Breastfeeding goal: To maintain breast

## 2019-04-21 NOTE — PROGRESS NOTES
[place] [facility] - [facility]    OB/GYNE Progress Note      Aguilar Mccartyck Patient Status:  Inpatient    3/30/1990 MRN [id number]   Location [facility] 3SE Attending Amberly Haile, [address] Day # 3 PCP Lacey Burch        Assessme 02/12/2019    HBVSAG Nonreactive  09/22/2018    ABO A 04/19/2019    RH Positive 04/19/2019    WBC 11.7 (H) 04/20/2019    HGB 9.4 (L) 04/20/2019    HCT 30.2 (L) 04/20/2019    .0 04/20/2019       Lab Results   Component Value Date    B12 399 09/22/201

## 2019-04-21 NOTE — DISCHARGE SUMMARY
[place] [facility] - [facility]    Discharge Summary    Jairo Sánchez Patient Status:  Inpatient    3/30/1990 MRN [id number]   Location [facility] 3SE Attending Herve Flores, [address] Day # 3       Delivering OB Clinician: Sandy Romero

## 2019-05-07 ENCOUNTER — POSTPARTUM (OUTPATIENT)
Dept: OBGYN CLINIC | Facility: CLINIC | Age: 29
End: 2019-05-07
Payer: COMMERCIAL

## 2019-05-07 ENCOUNTER — NURSE ONLY (OUTPATIENT)
Dept: LACTATION | Facility: HOSPITAL | Age: 29
End: 2019-05-07
Attending: ADVANCED PRACTICE MIDWIFE
Payer: COMMERCIAL

## 2019-05-07 VITALS
HEART RATE: 77 BPM | DIASTOLIC BLOOD PRESSURE: 84 MMHG | BODY MASS INDEX: 33.97 KG/M2 | HEIGHT: 63.5 IN | WEIGHT: 194.13 LBS | SYSTOLIC BLOOD PRESSURE: 127 MMHG

## 2019-05-07 DIAGNOSIS — O92.79 DISORDER OF LACTATION, POSTPARTUM CONDITION OR COMPLICATION: Primary | ICD-10-CM

## 2019-05-07 PROCEDURE — 99212 OFFICE O/P EST SF 10 MIN: CPT

## 2019-05-07 NOTE — PROGRESS NOTES
Patient was instructed by her midwife to be seen today in our lactation department due to latch difficulties. Exam of infant indicates a healthy appearing  at 18 ounces over birth weight at 35 weeks of age.  White milk coating (thick) observed on

## 2019-05-07 NOTE — PATIENT INSTRUCTIONS
Infant Discharge Feeding Plan -      Snuggle your baby in skin to skin contact between and during feedings whenever possible. Massage your breasts before nursing or pumping to soften areola if needed.     Breastfeed with hunger cues, most babies wi Call if a plugged duct or engorgement persists greater than 48 hours or if firm or reddened spots are present in breast with signs of fever, chills or flu like symptoms (possible breast infection/mastitis). Check your temperature during engorgement.

## 2019-05-11 PROBLEM — Z28.21 REFUSES TETANUS, DIPHTHERIA, AND ACELLULAR PERTUSSIS (TDAP) VACCINATION: Status: RESOLVED | Noted: 2019-02-28 | Resolved: 2019-05-11

## 2019-05-11 PROBLEM — Z53.20 REFUSAL OF TREATMENT BY PATIENT: Status: RESOLVED | Noted: 2019-04-03 | Resolved: 2019-05-11

## 2019-05-11 PROBLEM — O42.90 PROM (PREMATURE RUPTURE OF MEMBRANES): Status: RESOLVED | Noted: 2019-04-18 | Resolved: 2019-05-11

## 2019-05-11 PROBLEM — R73.02 IMPAIRED GLUCOSE TOLERANCE: Status: RESOLVED | Noted: 2019-02-16 | Resolved: 2019-05-11

## 2019-05-11 PROBLEM — Z28.21 INFLUENZA VACCINE REFUSED: Status: RESOLVED | Noted: 2018-10-06 | Resolved: 2019-05-11

## 2019-05-11 PROBLEM — N92.6 MISSED MENSES: Status: RESOLVED | Noted: 2018-09-01 | Resolved: 2019-05-11

## 2019-05-11 PROBLEM — Z78.9 BREASTFEEDING (INFANT): Status: RESOLVED | Noted: 2018-09-01 | Resolved: 2019-05-11

## 2019-05-11 PROBLEM — Z34.90 PREGNANCY: Status: RESOLVED | Noted: 2019-04-16 | Resolved: 2019-05-11

## 2019-05-11 PROBLEM — O36.8990 MECONIUM IN AMNIOTIC FLUID AFFECTING MANAGEMENT OF MOTHER: Status: RESOLVED | Noted: 2019-04-19 | Resolved: 2019-05-11

## 2019-05-12 NOTE — PROGRESS NOTES
HPI:   Jamila Medina is a 34year old  who presents for a 2 week Postpartum visit. Reports adapting well and coping well. Breastfeeding  Successfully, though baby with difficult latch on one side. No strain in relationship, partner supportive. dysuria, pelvic pain, vaginal discharge or discomfort; reports small amount lochia serosa  MUSCULOSKELETAL: denies back or pelvic pain,   EXTREM: denies calf pain or swelling  NEURO: denies headaches or dizziness  PSYCH: denies depression or anxiety.  Noted

## 2019-05-29 ENCOUNTER — TELEPHONE (OUTPATIENT)
Dept: ADMINISTRATIVE | Age: 29
End: 2019-05-29

## 2019-05-29 NOTE — TELEPHONE ENCOUNTER
Appeal     Pt called 5-29-19  Disability denied due to the date received. Form in BOGDAN X 1 mo. Appeal letter written and emailed copy to pt.      No addl charges CHARLES

## 2019-05-31 NOTE — TELEPHONE ENCOUNTER
Re-faxed Appeal/Disab to [facility] office [phone] per patients request. Jluis Free stating they did not receive.  SC

## 2019-06-06 ENCOUNTER — ROUTINE PRENATAL (OUTPATIENT)
Dept: OBGYN CLINIC | Facility: CLINIC | Age: 29
End: 2019-06-06
Payer: COMMERCIAL

## 2019-06-06 VITALS
DIASTOLIC BLOOD PRESSURE: 85 MMHG | WEIGHT: 190.13 LBS | SYSTOLIC BLOOD PRESSURE: 126 MMHG | HEART RATE: 76 BPM | HEIGHT: 63.5 IN | BODY MASS INDEX: 33.27 KG/M2

## 2019-06-10 ENCOUNTER — TELEPHONE (OUTPATIENT)
Dept: OBGYN CLINIC | Facility: CLINIC | Age: 29
End: 2019-06-10

## 2019-06-10 PROBLEM — R87.610 ASCUS OF CERVIX WITH NEGATIVE HIGH RISK HPV: Status: ACTIVE | Noted: 2019-06-10

## 2019-06-10 NOTE — PROGRESS NOTES
HPI:   Shin Ann is a 34year old  who presents for a 6 week Postpartum visit. Pt accompanied by . Reports adapting well and coping well but is tired. Is breastfeeding, but also pumping due to difficulty latching on one side.  No str voiding without difficulty, denies incontinence, dysuria, pelvic pain, vaginal discharge or discomfort; reports lochia resolved  MUSCULOSKELETAL: denies back or pelvic pain  EXTREM: denies calf pain, swelling, or edema  NEURO: denies headaches or dizziness

## 2019-06-11 NOTE — TELEPHONE ENCOUNTER
Reasonable accommodation form received in Forms dept+ FCR+ Signed release. Logged for processing.  NK

## 2019-06-12 NOTE — TELEPHONE ENCOUNTER
Silke Harding, ANP     Work accommodation form for [employer]. (Breasting feeding )  Patient is . for [employer]  Is requesting to have a different position X 2 mos so she can pump breast feeding every two hours in an eight hour day. If you support the frequency and duration please sign below. If you want the time revised please let me know. Please sign off on form:  -Highlight the patient and hit \"Chart\" button. -In Chart Review, w/in the Encounter tab - click 1 time on the Telephone call encounter for 19 . Scroll down the telephone encounter.  -Click \"scan on\" blue Hyperlink under \"Media\" heading for Veronica Erick ANP   form  19 __(desc) w/in the telephone enc.  -Click on Acknowledge button at the bottom right corner and left-click onto image, signature stamp appears and drag signature to Provider signature line. Stamp will turn blue. Close window.     Thank you,    Hernesto Lassiter

## 2019-06-25 NOTE — TELEPHONE ENCOUNTER
Carlos Hemphill,    Revised Accomm form showing \"pump breasts every 2 hrs and the duration changed from 12 months to 6 months. Please review. Please sign off on form:  -Highlight the patient and hit \"Chart\" button. -In Chart Review, w/in the Encounter tab - click 1 time on the Telephone call encounter for 5/29/19. Scroll down the telephone encounter.  -Click \"scan on\" blue Hyperlink under \"Media\" heading for Revised Accomm STALIN Calloway 6/25/19 w/in the telephone enc.  -Click on Acknowledge button at the bottom right corner and left-click onto image, signature stamp appears and drag signature to Provider signature line. Stamp will turn blue. Close window.     Thank you,  [facility]

## 2019-09-09 ENCOUNTER — TELEPHONE (OUTPATIENT)
Dept: OBGYN CLINIC | Facility: CLINIC | Age: 29
End: 2019-09-09

## 2019-09-09 NOTE — TELEPHONE ENCOUNTER
Pt wants copy of disability paperwork that states she can pump while at work. Unable to locate disability paperwork in Epic but can see note of adding pumping q2 hrs on 5/29/29.  Call transferred to FMLA/Disability dept for pt to speak w/ them regarding get

## 2019-09-19 NOTE — TELEPHONE ENCOUNTER
Veronica,     Patient is a  for the [employer].  She has been off work since 2/19/19 due to driving restrictions She delivered 4/18/19 and was released back to work 6 weeks later with an accomodation requesting to be allowed to pump every 2 hours for soren

## 2019-11-08 ENCOUNTER — TELEPHONE (OUTPATIENT)
Dept: OBGYN CLINIC | Facility: CLINIC | Age: 29
End: 2019-11-08

## 2019-12-02 NOTE — TELEPHONE ENCOUNTER
Pt called the Forms Dept and asked to get a copy of the revised [employer] form from Oct. Sent thru [place]. Pt aware.

## 2024-06-24 NOTE — PROGRESS NOTES
Pt is a 34year old female admitted to [facility]. Patient presents with:  Non-stress Test: send from the office by CNM for postdates and high BMI     Pt is  40w0d intra-uterine pregnancy. History obtained, consents signed.  Oriented to room, staff Pt symptoms not improving with zpac or tessalon pearles. She is asking if she should come in for an appointment or PCP would like her to try a different medication.

## (undated) NOTE — LETTER
6/6/2019        To whom it may concern,     Zacariaskatia Tammy was seen in our office today for her 6 week postpartum appointment.  She is now cleared and able to return to work on July 1st.        Yayo Metzger, APN  [address]

## (undated) NOTE — Clinical Note
1. IUP @  21w2d 2. Scan consistent with dates3. No fetal structural abnormalities seen4. Obesity BMI 33RECOMMENDATIONS:Weekly NST at 36wksEarly diabetes testing.

## (undated) NOTE — LETTER
To Whom It May Concern: Marta Baker has been under our care regarding ongoing medical issues. Because of this, she has been required to be off from work.     She may resume her usual activities, including work, on 03/04/19 with the following restr